# Patient Record
Sex: FEMALE | Race: WHITE | NOT HISPANIC OR LATINO | URBAN - METROPOLITAN AREA
[De-identification: names, ages, dates, MRNs, and addresses within clinical notes are randomized per-mention and may not be internally consistent; named-entity substitution may affect disease eponyms.]

---

## 2017-12-28 ENCOUNTER — APPOINTMENT (RX ONLY)
Dept: URBAN - METROPOLITAN AREA CLINIC 86 | Facility: CLINIC | Age: 22
Setting detail: DERMATOLOGY
End: 2017-12-28

## 2017-12-28 DIAGNOSIS — L70.0 ACNE VULGARIS: ICD-10-CM

## 2017-12-28 PROCEDURE — ? TREATMENT REGIMEN

## 2017-12-28 PROCEDURE — 99213 OFFICE O/P EST LOW 20 MIN: CPT

## 2017-12-28 PROCEDURE — ? COUNSELING

## 2017-12-28 PROCEDURE — ? PRESCRIPTION

## 2017-12-28 RX ORDER — SPIRONOLACTONE 50 MG/1
TABLET, FILM COATED ORAL
Qty: 30 | Refills: 2 | Status: ERX | COMMUNITY
Start: 2017-12-28

## 2017-12-28 RX ADMIN — SPIRONOLACTONE: 50 TABLET, FILM COATED ORAL at 21:29

## 2017-12-28 ASSESSMENT — LOCATION ZONE DERM: LOCATION ZONE: FACE

## 2017-12-28 ASSESSMENT — LOCATION DETAILED DESCRIPTION DERM: LOCATION DETAILED: LEFT LATERAL BUCCAL CHEEK

## 2017-12-28 ASSESSMENT — LOCATION SIMPLE DESCRIPTION DERM: LOCATION SIMPLE: LEFT CHEEK

## 2017-12-28 NOTE — PROCEDURE: TREATMENT REGIMEN
Detail Level: Zone
Initiate Treatment: Spironolactone 50mg qd w/food send to local pharmacy today
Plan: Consider Accutane [place] ([place] in [place])